# Patient Record
Sex: FEMALE | Race: WHITE | NOT HISPANIC OR LATINO | Employment: FULL TIME | URBAN - METROPOLITAN AREA
[De-identification: names, ages, dates, MRNs, and addresses within clinical notes are randomized per-mention and may not be internally consistent; named-entity substitution may affect disease eponyms.]

---

## 2017-09-12 ENCOUNTER — APPOINTMENT (OUTPATIENT)
Dept: WOMENS IMAGING | Facility: HOSPITAL | Age: 37
End: 2017-09-12

## 2017-09-12 PROCEDURE — 77067 SCR MAMMO BI INCL CAD: CPT | Performed by: RADIOLOGY

## 2017-09-12 PROCEDURE — 77063 BREAST TOMOSYNTHESIS BI: CPT | Performed by: RADIOLOGY

## 2017-09-27 ENCOUNTER — APPOINTMENT (OUTPATIENT)
Dept: WOMENS IMAGING | Facility: HOSPITAL | Age: 37
End: 2017-09-27

## 2017-09-27 PROCEDURE — 76641 ULTRASOUND BREAST COMPLETE: CPT | Performed by: RADIOLOGY

## 2017-09-27 PROCEDURE — G0206 DX MAMMO INCL CAD UNI: HCPCS | Performed by: RADIOLOGY

## 2017-09-27 PROCEDURE — G0279 TOMOSYNTHESIS, MAMMO: HCPCS | Performed by: RADIOLOGY

## 2017-09-27 PROCEDURE — 77065 DX MAMMO INCL CAD UNI: CPT | Performed by: RADIOLOGY

## 2017-09-27 PROCEDURE — 77061 BREAST TOMOSYNTHESIS UNI: CPT | Performed by: RADIOLOGY

## 2018-03-27 ENCOUNTER — APPOINTMENT (OUTPATIENT)
Dept: WOMENS IMAGING | Facility: HOSPITAL | Age: 38
End: 2018-03-27

## 2018-03-27 PROCEDURE — 76641 ULTRASOUND BREAST COMPLETE: CPT | Performed by: RADIOLOGY

## 2018-03-27 PROCEDURE — MDREVIEWSP: Performed by: RADIOLOGY

## 2018-10-09 ENCOUNTER — APPOINTMENT (OUTPATIENT)
Dept: WOMENS IMAGING | Facility: HOSPITAL | Age: 38
End: 2018-10-09

## 2018-10-09 PROCEDURE — 77067 SCR MAMMO BI INCL CAD: CPT | Performed by: RADIOLOGY

## 2018-10-09 PROCEDURE — 76641 ULTRASOUND BREAST COMPLETE: CPT | Performed by: RADIOLOGY

## 2018-10-09 PROCEDURE — 77063 BREAST TOMOSYNTHESIS BI: CPT | Performed by: RADIOLOGY

## 2018-10-29 ENCOUNTER — OFFICE VISIT (OUTPATIENT)
Dept: INTERNAL MEDICINE | Facility: CLINIC | Age: 38
End: 2018-10-29

## 2018-10-29 VITALS
HEIGHT: 63 IN | TEMPERATURE: 98.4 F | HEART RATE: 62 BPM | DIASTOLIC BLOOD PRESSURE: 86 MMHG | BODY MASS INDEX: 35.26 KG/M2 | SYSTOLIC BLOOD PRESSURE: 114 MMHG | OXYGEN SATURATION: 99 % | RESPIRATION RATE: 16 BRPM | WEIGHT: 199 LBS

## 2018-10-29 DIAGNOSIS — Z80.3 FAMILY HISTORY OF BREAST CANCER: ICD-10-CM

## 2018-10-29 DIAGNOSIS — Z00.00 ENCOUNTER FOR PREVENTIVE CARE: ICD-10-CM

## 2018-10-29 PROCEDURE — 99385 PREV VISIT NEW AGE 18-39: CPT | Performed by: NURSE PRACTITIONER

## 2018-10-29 NOTE — PROGRESS NOTES
Subjective      CC: establish care, family history of breast cancer    Cindy Ivan is a 38 y.o. female. She has no chronic health problems. She is not a smoker. She is single and has no children. She has no chronic health problems. Surgeries are only a fracture surgery to arm. Her sister had breast cancer at age 40 and mother had uterine cancer.     History of Present Illness     The following portions of the patient's history were reviewed and updated as appropriate: allergies, current medications, past family history, past medical history, past social history, past surgical history and problem list.    Review of Systems   Constitutional: Negative.  Negative for fever and unexpected weight change.   HENT: Negative.    Eyes: Negative.  Negative for photophobia, discharge and visual disturbance.   Respiratory: Negative.  Negative for apnea, cough, chest tightness and wheezing.    Cardiovascular: Negative.  Negative for chest pain, palpitations and leg swelling.   Gastrointestinal: Negative.    Endocrine: Negative.    Genitourinary: Negative.  Negative for decreased urine volume, difficulty urinating, dyspareunia and flank pain.   Musculoskeletal: Negative.  Negative for arthralgias and back pain.   Skin: Negative.  Negative for color change and pallor.   Allergic/Immunologic: Negative.  Negative for environmental allergies, food allergies and immunocompromised state.   Neurological: Negative.    Hematological: Negative.    Psychiatric/Behavioral: Negative.        Objective   Physical Exam   Constitutional: She is oriented to person, place, and time. She appears well-developed and well-nourished.   HENT:   Head: Normocephalic.   Right Ear: External ear normal.   Left Ear: External ear normal.   Nose: Nose normal.   Mouth/Throat: Oropharynx is clear and moist. No oropharyngeal exudate.   Neck: Neck supple. No thyromegaly present.   Cardiovascular: Normal rate, regular rhythm and normal heart sounds.    No murmur  heard.  Pulmonary/Chest: Effort normal and breath sounds normal. No respiratory distress. She has no wheezes.   Abdominal: Soft. Bowel sounds are normal. She exhibits no distension. There is no tenderness.   Musculoskeletal: She exhibits no edema.   Neurological: She is alert and oriented to person, place, and time.   Skin: Skin is warm and dry.   Psychiatric: She has a normal mood and affect. Her behavior is normal.   Vitals reviewed.      Assessment/Plan   Cindy was seen today for establish care.    Diagnoses and all orders for this visit:    Family history of breast cancer    Encounter for preventive care  -     CBC & Differential; Future  -     Comprehensive metabolic panel; Future  -     Conv Lipid Panel w/ Chol/HDL Ratio; Future  -     T4 & TSH ([facility]); Future    Discussed BRAC screening due to FH of breast cancer.    Reviewed preventive labs and cancer screenings appropriate for age. She has had a Mammogram with [facility].     Follow up for labs and once yearly.

## 2018-10-29 NOTE — PATIENT INSTRUCTIONS
Genomic Testing for Breast Cancer  What is genomic testing for breast cancer?  Genomic testing is performed on samples of breast cancer cells to help predict the outcome of the disease and to help direct treatment. Genomic testing is used to look at breast cancer cells on a molecular level, examining the gene groups in the cells and determining their activity.  Why is genomic testing for breast cancer done?  Genomic testing helps your health care provider choose the best type of treatment for your breast cancer. It provides information on how quickly the breast cancer cells are growing and how aggressive they might be in invading nearby tissue.  How is genomic testing for breast cancer done?  A sample of the tumor is sent to a lab that specializes in genomic testing. A variety of tests are run on the tissue. These tests show the activity of a group of genes in the breast cancer tissue. Based on these results, a score is generated that describes the risk of the cancer coming back again. The score may be numerical, such as 0-100, with 100 designating a high risk of the cancer coming back. Scoring may also involve sorting into risk categories, such as low, moderate, or high.  What will the test results tell me?  Genomic testing for breast cancer can provide a number of pieces of information. It can help predict:  · The best treatment for your breast cancer.  · The likelihood that you will benefit from receiving chemotherapy.  · The chance that your breast cancer will come back.    What is the difference between genomic testing and genetic testing for breast cancer?  Genetic testing looks at a person's genes and helps predict whether the person is at risk for developing cancer in the future. Genomic testing looks at cancer cells and identifies whether specific genes in the tumor are turned on or turned off. This can help the health care provider identify the best type of therapy to use against that tumor.  What is the  difference between genomic testing and pathology testing of a breast tumor?  Pathology testing provides information about the tumor. The pathology report will tell you:  · Whether the tumor cells are cancerous, meaning they are growing in a disorganized and uncontrolled fashion.  · Tumor size.  · How invasive the tumor cells are, meaning they have the capability of invading nearby tissue.  · Whether the cancer cells have stayed in their original location or have grown into other tissues in your body.  · Whether the tumor cells are fast growing or slow growing.  · If the entire tumor has been removed or whether there might be some cancer cells remaining behind.  · Whether there are cancer cells in your lymph nodes or blood vessels.  · Whether the cancer cells grow in response to hormones.  · Whether certain genes are present in the cancer cells and how many of these genes are present.    Genomic testing will tell you about the activity of groups of genes in your breast cancer cells. This activity can help your health care provider decide how aggressive the cancer is, how much risk you have of the cancer coming back, and whether you will benefit from treatments such as chemotherapy.  This information is not intended to replace advice given to you by your health care provider. Make sure you discuss any questions you have with your health care provider.  Document Released: 07/15/2015 Document Revised: 11/13/2017 Document Reviewed: 05/21/2015  [employer] Interactive Patient Education © 2017 [employer]

## 2018-11-01 PROBLEM — Z00.00 ENCOUNTER FOR PREVENTIVE CARE: Status: ACTIVE | Noted: 2018-11-01

## 2018-11-16 ENCOUNTER — LAB (OUTPATIENT)
Dept: INTERNAL MEDICINE | Facility: CLINIC | Age: 38
End: 2018-11-16

## 2018-11-16 DIAGNOSIS — Z00.00 ENCOUNTER FOR PREVENTIVE CARE: ICD-10-CM

## 2018-11-16 LAB
ALBUMIN SERPL-MCNC: 3.3 G/DL (ref 3.5–5.2)
ALBUMIN/GLOB SERPL: 1.4 G/DL
ALP SERPL-CCNC: 45 U/L (ref 40–129)
ALT SERPL-CCNC: 18 U/L (ref 5–33)
AST SERPL-CCNC: 18 U/L (ref 5–32)
BASOPHILS # BLD AUTO: 0.01 10*3/MM3 (ref 0–0.2)
BASOPHILS NFR BLD AUTO: 0.2 % (ref 0–2)
BILIRUB SERPL-MCNC: 0.5 MG/DL (ref 0.2–1.2)
BUN SERPL-MCNC: 10 MG/DL (ref 6–20)
BUN/CREAT SERPL: 14.7 (ref 7–25)
CALCIUM SERPL-MCNC: 8 MG/DL (ref 8.6–10.5)
CHLORIDE SERPL-SCNC: 102 MMOL/L (ref 98–107)
CHOLEST SERPL-MCNC: 152 MG/DL (ref 0–200)
CHOLEST/HDLC SERPL: 2.67 {RATIO}
CO2 SERPL-SCNC: 21.7 MMOL/L (ref 22–29)
CREAT SERPL-MCNC: 0.68 MG/DL (ref 0.57–1)
EOSINOPHIL # BLD AUTO: 0.07 10*3/MM3 (ref 0.1–0.3)
EOSINOPHIL NFR BLD AUTO: 1.7 % (ref 0–4)
ERYTHROCYTE [DISTWIDTH] IN BLOOD BY AUTOMATED COUNT: 13 % (ref 11.5–14.5)
GLOBULIN SER CALC-MCNC: 2.4 GM/DL
GLUCOSE SERPL-MCNC: 67 MG/DL (ref 65–99)
HCT VFR BLD AUTO: 42.8 % (ref 37–47)
HDLC SERPL-MCNC: 57 MG/DL (ref 40–60)
HGB BLD-MCNC: 13.8 G/DL (ref 12–16)
IMM GRANULOCYTES # BLD: 0 10*3/MM3 (ref 0–0.03)
IMM GRANULOCYTES NFR BLD: 0 % (ref 0–0.5)
LDLC SERPL CALC-MCNC: 88 MG/DL (ref 0–100)
LYMPHOCYTES # BLD AUTO: 1.62 10*3/MM3 (ref 0.6–4.8)
LYMPHOCYTES NFR BLD AUTO: 38.2 % (ref 20–45)
MCH RBC QN AUTO: 29.1 PG (ref 27–31)
MCHC RBC AUTO-ENTMCNC: 32.2 G/DL (ref 31–37)
MCV RBC AUTO: 90.3 FL (ref 81–99)
MONOCYTES # BLD AUTO: 0.37 10*3/MM3 (ref 0–1)
MONOCYTES NFR BLD AUTO: 8.7 % (ref 3–8)
NEUTROPHILS # BLD AUTO: 2.17 10*3/MM3 (ref 1.5–8.3)
NEUTROPHILS NFR BLD AUTO: 51.2 % (ref 45–70)
NRBC BLD AUTO-RTO: 0 /100 WBC (ref 0–0)
PLATELET # BLD AUTO: 284 10*3/MM3 (ref 140–500)
POTASSIUM SERPL-SCNC: 4.6 MMOL/L (ref 3.5–5.2)
PROT SERPL-MCNC: 5.7 G/DL (ref 6–8.5)
RBC # BLD AUTO: 4.74 10*6/MM3 (ref 4.2–5.4)
SODIUM SERPL-SCNC: 140 MMOL/L (ref 136–145)
T4 SERPL-MCNC: 7.73 MCG/DL (ref 4.5–11.7)
TRIGL SERPL-MCNC: 34 MG/DL (ref 0–150)
TSH SERPL DL<=0.005 MIU/L-ACNC: 1.06 MIU/ML (ref 0.27–4.2)
VLDLC SERPL CALC-MCNC: 6.8 MG/DL (ref 7–27)
WBC # BLD AUTO: 4.24 10*3/MM3 (ref 4.8–10.8)

## 2019-01-24 ENCOUNTER — OFFICE VISIT (OUTPATIENT)
Dept: INTERNAL MEDICINE | Facility: CLINIC | Age: 39
End: 2019-01-24

## 2019-01-24 ENCOUNTER — HOSPITAL ENCOUNTER (OUTPATIENT)
Dept: GENERAL RADIOLOGY | Facility: HOSPITAL | Age: 39
Discharge: HOME | End: 2019-01-24
Admitting: NURSE PRACTITIONER

## 2019-01-24 VITALS
HEIGHT: 63 IN | SYSTOLIC BLOOD PRESSURE: 130 MMHG | TEMPERATURE: 98.2 F | DIASTOLIC BLOOD PRESSURE: 78 MMHG | RESPIRATION RATE: 16 BRPM | HEART RATE: 76 BPM | OXYGEN SATURATION: 98 % | BODY MASS INDEX: 35.08 KG/M2 | WEIGHT: 198 LBS

## 2019-01-24 DIAGNOSIS — S20.212A RIB CONTUSION, LEFT, INITIAL ENCOUNTER: Primary | ICD-10-CM

## 2019-01-24 DIAGNOSIS — S20.212A RIB CONTUSION, LEFT, INITIAL ENCOUNTER: ICD-10-CM

## 2019-01-24 PROCEDURE — 71101 X-RAY EXAM UNILAT RIBS/CHEST: CPT

## 2019-01-24 PROCEDURE — 99213 OFFICE O/P EST LOW 20 MIN: CPT | Performed by: NURSE PRACTITIONER

## 2019-01-24 RX ORDER — MELOXICAM 15 MG/1
15 TABLET ORAL DAILY
Qty: 14 TABLET | Refills: 0 | Status: SHIPPED | OUTPATIENT
Start: 2019-01-24 | End: 2019-05-10

## 2019-01-24 NOTE — PROGRESS NOTES
Chief Complaint   Patient presents with   • Abdominal Pain     LUQ-onset after execise x 2 weeks       Subjective     Cindy Ivan is a 38 y.o. female being seen for a follow up appointment today regarding Left side pain that started after doing planked back extensions, 2 weeks ago at the gym.. She felt a pop in her left side, when she lifted her back up. Described as a dull, ache. Rated 4 of 10. Worse with laying on left side, sitting,  and twisting torso. Associted swelling, but no bruising. She alternated heat/ice and took Ibuprofen. It has progressively improved.      History of Present Illness     No Known Allergies    No current outpatient medications on file.    The following portions of the patient's history were reviewed and updated as appropriate: allergies, current medications, past family history, past medical history, past social history, past surgical history and problem list.    Review of Systems   Constitutional: Negative.    HENT: Negative.    Eyes: Negative.    Respiratory: Positive for shortness of breath.         On inhalation x 4 days but resolved now   Cardiovascular: Negative.    Gastrointestinal: Positive for abdominal pain.        LUQ   Endocrine: Negative.    Genitourinary: Negative.    Musculoskeletal: Negative.    Skin: Negative.    Allergic/Immunologic: Negative.    Neurological: Negative.    Hematological: Negative.    Psychiatric/Behavioral: Negative.        Assessment     Physical Exam   Constitutional: She is oriented to person, place, and time. She appears well-developed and well-nourished.   HENT:   Head: Normocephalic.   Right Ear: External ear normal.   Left Ear: External ear normal.   Nose: Nose normal.   Mouth/Throat: Oropharynx is clear and moist. No oropharyngeal exudate.   Neck: Neck supple. No thyromegaly present.   Cardiovascular: Normal rate, regular rhythm and normal heart sounds.   No murmur heard.  Pulmonary/Chest: Breath sounds normal. No accessory muscle usage or  stridor. No respiratory distress. She has no wheezes. She exhibits tenderness (along left sternal border, 6th and 7th ribs) and swelling (left 6th anfd 7th ribs). She exhibits no mass and no laceration.   Abdominal: Soft. Bowel sounds are normal. She exhibits no distension.   Neurological: She is alert and oriented to person, place, and time.   Skin: Skin is warm and dry.   Psychiatric: She has a normal mood and affect. Her behavior is normal.   Vitals reviewed.      Plan     Her fasting labs were reviewed with the patient from last week.     Cindy was seen today for abdominal pain.    Diagnoses and all orders for this visit:    Rib contusion, left, initial encounter  -     XR Ribs Left With PA Chest; Future    Other orders  -     meloxicam (MOBIC) 15 MG tablet; Take 1 tablet by mouth Daily.    Discussed nature of the injury, and healing time may take 6-8 weeks. Use heat and take Mobic as instructed.     Follow up as needed

## 2019-01-24 NOTE — PATIENT INSTRUCTIONS
Rib Contusion  A rib contusion is a deep bruise on your rib area. Contusions are the result of a blunt trauma that causes bleeding and injury to the tissues under the skin. A rib contusion may involve bruising of the ribs and of the skin and muscles in the area. The skin overlying the contusion may turn blue, purple, or yellow. Minor injuries will give you a painless contusion, but more severe contusions may stay painful and swollen for a few weeks.  What are the causes?  A contusion is usually caused by a blow, trauma, or direct force to an area of the body. This often occurs while playing contact sports.  What are the signs or symptoms?  · Swelling and redness of the injured area.  · Discoloration of the injured area.  · Tenderness and soreness of the injured area.  · Pain with or without movement.  How is this diagnosed?  The diagnosis can be made by taking a medical history and performing a physical exam. An X-ray, CT scan, or MRI may be needed to determine if there were any associated injuries, such as broken bones (fractures) or internal injuries.  How is this treated?  Often, the best treatment for a rib contusion is rest. Icing or applying cold compresses to the injured area may help reduce swelling and inflammation. Deep breathing exercises may be recommended to reduce the risk of partial lung collapse and pneumonia. Over-the-counter or prescription medicines may also be recommended for pain control.  Follow these instructions at home:  · Apply ice to the injured area:  ? Put ice in a plastic bag.  ? Place a towel between your skin and the bag.  ? Leave the ice on for 20 minutes, 2-3 times per day.  · Take medicines only as directed by your health care provider.  · Rest the injured area. Avoid strenuous activity and any activities or movements that cause pain. Be careful during activities and avoid bumping the injured area.  · Perform deep-breathing exercises as directed by your health care provider.  · Do  not lift anything that is heavier than 5 lb (2.3 kg) until your health care provider approves.  · Do not use any tobacco products, including cigarettes, chewing tobacco, or electronic cigarettes. If you need help quitting, ask your health care provider.  Contact a health care provider if:  · You have increased bruising or swelling.  · You have pain that is not controlled with treatment.  · You have a fever.  Get help right away if:  · You have difficulty breathing or shortness of breath.  · You develop a continual cough, or you cough up thick or bloody sputum.  · You feel sick to your stomach (nauseous), you throw up (vomit), or you have abdominal pain.  This information is not intended to replace advice given to you by your health care provider. Make sure you discuss any questions you have with your health care provider.  Document Released: 09/12/2002 Document Revised: 05/25/2017 Document Reviewed: 09/29/2015  [employer] Interactive Patient Education © 2018 [employer]

## 2019-01-25 ENCOUNTER — TELEPHONE (OUTPATIENT)
Dept: INTERNAL MEDICINE | Facility: CLINIC | Age: 39
End: 2019-01-25

## 2019-05-10 ENCOUNTER — OFFICE VISIT (OUTPATIENT)
Dept: INTERNAL MEDICINE | Facility: CLINIC | Age: 39
End: 2019-05-10

## 2019-05-10 VITALS
BODY MASS INDEX: 34.91 KG/M2 | TEMPERATURE: 98.9 F | RESPIRATION RATE: 16 BRPM | HEIGHT: 63 IN | HEART RATE: 84 BPM | SYSTOLIC BLOOD PRESSURE: 112 MMHG | OXYGEN SATURATION: 98 % | DIASTOLIC BLOOD PRESSURE: 82 MMHG | WEIGHT: 197 LBS

## 2019-05-10 DIAGNOSIS — J01.00 ACUTE MAXILLARY SINUSITIS, RECURRENCE NOT SPECIFIED: Primary | ICD-10-CM

## 2019-05-10 DIAGNOSIS — H66.91 RIGHT ACUTE OTITIS MEDIA: ICD-10-CM

## 2019-05-10 PROCEDURE — 99213 OFFICE O/P EST LOW 20 MIN: CPT | Performed by: NURSE PRACTITIONER

## 2019-05-10 RX ORDER — AZITHROMYCIN 250 MG/1
TABLET, FILM COATED ORAL
Qty: 6 TABLET | Refills: 0 | Status: SHIPPED | OUTPATIENT
Start: 2019-05-10 | End: 2019-11-07

## 2019-05-10 NOTE — PROGRESS NOTES
Chief Complaint   Patient presents with   • Nasal Congestion   • URI       Subjective   Cindy Ivan is a 38 y.o. female is being seen for an acute appointment for URI. This began Monday with body aches, fever. She then developed ear pain, cough, nasal congestion. She is taking OTC cough syrup.     History of Present Illness     Current Outpatient Medications on File Prior to Visit   Medication Sig Dispense Refill   • [DISCONTINUED] meloxicam (MOBIC) 15 MG tablet Take 1 tablet by mouth Daily. 14 tablet 0     No current facility-administered medications on file prior to visit.        The following portions of the patient's history were reviewed and updated as appropriate: allergies, current medications, past family history, past medical history, past social history, past surgical history and problem list.    Review of Systems   Constitutional: Positive for fever.   HENT: Positive for congestion, postnasal drip, rhinorrhea, sinus pressure, sinus pain, sneezing and sore throat.    Eyes: Negative.    Respiratory: Positive for cough and shortness of breath.    Cardiovascular: Negative.  Negative for chest pain and leg swelling.   Endocrine: Negative.    Musculoskeletal: Negative.    Allergic/Immunologic: Positive for environmental allergies.   Neurological: Negative.    Psychiatric/Behavioral: Negative.        Objective   Physical Exam   Constitutional: She is oriented to person, place, and time. She appears well-developed and well-nourished.   HENT:   Head: Normocephalic.   Right Ear: Tympanic membrane is injected. A middle ear effusion is present.   Left Ear: A middle ear effusion is present.   Nose: Mucosal edema and rhinorrhea present. Right sinus exhibits frontal sinus tenderness. Left sinus exhibits frontal sinus tenderness.   Mouth/Throat: Oropharynx is clear and moist.   Neck: Neck supple.   Cardiovascular: Normal rate, regular rhythm and normal heart sounds.   No murmur heard.  Pulmonary/Chest: Effort normal and  breath sounds normal. No stridor. No respiratory distress.   Neurological: She is alert and oriented to person, place, and time.   Skin: Skin is warm and dry.   Psychiatric: She has a normal mood and affect. Her behavior is normal.   Vitals reviewed.      Assessment/Plan   Cindy was seen today for nasal congestion and uri.    Diagnoses and all orders for this visit:    Acute maxillary sinusitis, recurrence not specified  -     azithromycin (ZITHROMAX Z-MEGHANN) 250 MG tablet; Take 2 tablets the first day, then 1 tablet daily for 4 days.    Right acute otitis media  -     azithromycin (ZITHROMAX Z-MEGHANN) 250 MG tablet; Take 2 tablets the first day, then 1 tablet daily for 4 days.      Follow up as needed

## 2019-11-07 ENCOUNTER — OFFICE VISIT (OUTPATIENT)
Dept: INTERNAL MEDICINE | Facility: CLINIC | Age: 39
End: 2019-11-07

## 2019-11-07 VITALS
HEART RATE: 65 BPM | TEMPERATURE: 98 F | DIASTOLIC BLOOD PRESSURE: 86 MMHG | BODY MASS INDEX: 35.97 KG/M2 | SYSTOLIC BLOOD PRESSURE: 118 MMHG | WEIGHT: 203 LBS | RESPIRATION RATE: 16 BRPM | OXYGEN SATURATION: 99 % | HEIGHT: 63 IN

## 2019-11-07 DIAGNOSIS — M70.62 TROCHANTERIC BURSITIS OF LEFT HIP: Primary | ICD-10-CM

## 2019-11-07 PROBLEM — J01.00 ACUTE MAXILLARY SINUSITIS: Status: RESOLVED | Noted: 2019-05-10 | Resolved: 2019-11-07

## 2019-11-07 PROCEDURE — 99213 OFFICE O/P EST LOW 20 MIN: CPT | Performed by: NURSE PRACTITIONER

## 2019-11-07 RX ORDER — METHYLPREDNISOLONE 4 MG/1
TABLET ORAL
Qty: 21 EACH | Refills: 0 | Status: SHIPPED | OUTPATIENT
Start: 2019-11-07

## 2019-11-07 NOTE — PATIENT INSTRUCTIONS
Trochanteric Bursitis Rehab  Ask your health care provider which exercises are safe for you. Do exercises exactly as told by your health care provider and adjust them as directed. It is normal to feel mild stretching, pulling, tightness, or discomfort as you do these exercises, but you should stop right away if you feel sudden pain or your pain gets worse. Do not begin these exercises until told by your health care provider.  Stretching exercises  These exercises warm up your muscles and joints and improve the movement and flexibility of your hip. These exercises also help to relieve pain and stiffness.  Exercise A: Iliotibial band stretch    Lie on your side with your left / right leg in the top position.  Bend your left / right knee and grab your ankle.  Slowly bring your knee back so your thigh is behind your body.  Slowly lower your knee toward the floor until you feel a gentle stretch on the outside of your left / right thigh. If you do not feel a stretch and your knee will not fall farther, place the heel of your other foot on top of your outer knee and pull your thigh down farther.  Hold this position for __________ seconds.  Slowly return to the starting position.  Repeat __________ times. Complete this exercise __________ times a day.  Strengthening exercises  These exercises build strength and endurance in your hip and pelvis. Endurance is the ability to use your muscles for a long time, even after they get tired.  Exercise B: Bridge (hip extensors)    Lie on your back on a firm surface with your knees bent and your feet flat on the floor.  Tighten your buttocks muscles and lift your buttocks off the floor until your trunk is level with your thighs. You should feel the muscles working in your buttocks and the back of your thighs. If this exercise is too easy, try doing it with your arms crossed over your chest.  Hold this position for __________ seconds.  Slowly return to the starting position.  Let your  muscles relax completely between repetitions.  Repeat __________ times. Complete this exercise __________ times a day.  Exercise C: Squats (knee extensors and  quadriceps)   front of a table, with your feet and knees pointing straight ahead. You may rest your hands on the table for balance but not for support.  Slowly bend your knees and lower your hips like you are going to sit in a chair.  Keep your weight over your heels, not over your toes.  Keep your lower legs upright so they are parallel with the table legs.  Do not let your hips go lower than your knees.  Do not bend lower than told by your health care provider.  If your hip pain increases, do not bend as low.  Hold this position for __________ seconds.  Slowly push with your legs to return to standing. Do not use your hands to pull yourself to standing.  Repeat __________ times. Complete this exercise __________ times a day.  Exercise D: Hip hike  Stand sideways on a bottom step. Stand on your left / right leg with your other foot unsupported next to the step. You can hold onto the railing or wall if needed for balance.  Keeping your knees straight and your torso square, lift your left / right hip up toward the ceiling.  Hold this position for __________ seconds.  Slowly let your left / right hip lower toward the floor, past the starting position. Your foot should get closer to the floor. Do not lean or bend your knees.  Repeat __________ times. Complete this exercise __________ times a day.  Exercise E: Single leg stand  Stand near a counter or door frame that you can hold onto for balance as needed. It is helpful to  front of a mirror for this exercise so you can watch your hip.  Squeeze your left / right buttock muscles then lift up your other foot. Do not let your left / right hip push out to the side.  Hold this position for __________ seconds.  Repeat __________ times. Complete this exercise __________ times a day.  This information is  not intended to replace advice given to you by your health care provider. Make sure you discuss any questions you have with your health care provider.  Document Released: 01/25/2006 Document Revised: 08/24/2017 Document Reviewed: 12/02/2016  [employer] Interactive Patient Education © 2019 [employer]  Trochanteric Bursitis  Trochanteric bursitis is a condition that causes hip pain. Trochanteric bursitis happens when fluid-filled sacs (bursae) in the hip get irritated. Normally these sacs absorb shock and help strong bands of tissue (tendons) in your hip glide smoothly over each other and over your hip bones.  What are the causes?  This condition results from increased friction between the hip bones and the tendons that go over them. This condition can happen if you:  · Have weak hips.  · Use your hip muscles too much (overuse).  · Get hit in the hip.  What increases the risk?  This condition is more likely to develop in:  · Women.  · Adults who are middle-aged or older.  · People with arthritis or a spinal condition.  · People with weak buttocks muscles (gluteal muscles).  · People who have one leg that is shorter than the other.  · People who participate in certain kinds of athletic activities, such as:  ? Running sports, especially long-distance running.  ? Contact sports, like football or martial arts.  ? Sports in which falls may occur, like skiing.  What are the signs or symptoms?  The main symptom of this condition is pain and tenderness over the point of your hip. The pain may be:  · Sharp and intense.  · Dull and achy.  · Felt on the outside of your thigh.  It may increase when you:  · Lie on your side.  · Walk or run.  · Go up on stairs.  · Sit.  · Stand up after sitting.  · Stand for long periods of time.  How is this diagnosed?  This condition may be diagnosed based on:  · Your symptoms.  · Your medical history.  · A physical exam.  · Imaging tests, such as:  ? X-rays to check your bones.  ? An MRI or  ultrasound to check your tendons and muscles.  During your physical exam, your health care provider will check the movement and strength of your hip. He or she may press on the point of your hip to check for pain.  How is this treated?  This condition may be treated by:  · Resting.  · Reducing your activity.  · Avoiding activities that cause pain.  · Using crutches, a cane, or a walker to decrease the strain on your hip.  · Taking medicine to help with swelling.  · Having medicine injected into the bursae to help with swelling.  · Using ice, heat, and massage therapy for pain relief.  · Physical therapy exercises for strength and flexibility.  · Surgery (rare).  Follow these instructions at home:  Activity  · Rest.  · Avoid activities that cause pain.  · Return to your normal activities as told by your health care provider. Ask your health care provider what activities are safe for you.  Managing pain, stiffness, and swelling  · Take over-the-counter and prescription medicines only as told by your health care provider.  · If directed, apply heat to the injured area as told by your health care provider.  ? Place a towel between your skin and the heat source.  ? Leave the heat on for 20-30 minutes.  ? Remove the heat if your skin turns bright red. This is especially important if you are unable to feel pain, heat, or cold. You may have a greater risk of getting burned.  · If directed, apply ice to the injured area:  ? Put ice in a plastic bag.  ? Place a towel between your skin and the bag.  ? Leave the ice on for 20 minutes, 2-3 times a day.  General instructions  · If the affected leg is one that you use for driving, ask your health care provider when it is safe to drive.  · Use crutches, a cane, or a walker as told by your health care provider.  · If one of your legs is shorter than the other, get fitted for a shoe insert.  · Lose weight if you are overweight.  How is this prevented?  · Wear supportive footwear that  is appropriate for your sport.  · If you have hip pain, start any new exercise or sport slowly.  · Maintain physical fitness, including:  ? Strength.  ? Flexibility.  Contact a health care provider if:  · Your pain does not improve with 2-4 weeks.  Get help right away if:  · You develop severe pain.  · You have a fever.  · You develop increased redness over your hip.  · You have a change in your bowel function or bladder function.  · You cannot control the muscles in your feet.  This information is not intended to replace advice given to you by your health care provider. Make sure you discuss any questions you have with your health care provider.  Document Released: 01/25/2006 Document Revised: 08/23/2017 Document Reviewed: 12/02/2016  [employer] Interactive Patient Education © 2019 [employer]

## 2019-11-07 NOTE — PROGRESS NOTES
Chief Complaint   Patient presents with   • Hip Pain     LT hip pain X4 weeks with activity        Subjective   Cindy Ivan is a 39 y.o. female is being seen for an acute appointment for left hip pain. She has seen Dr. Johnson in the past for bursitis and has had a cortisone shot in the past. She started running for exercise a few months ago. She reports that left hip pain started 4 weeks ago. Pain worse with long strides, running , laying on her left side, after sitting for a while and getting back up.  Pain described as a sharp pain, rated 4 of 10. Pain 0 at rest.     History of Present Illness     Current Outpatient Medications on File Prior to Visit   Medication Sig Dispense Refill   • [DISCONTINUED] azithromycin (ZITHROMAX Z-MEGHANN) 250 MG tablet Take 2 tablets the first day, then 1 tablet daily for 4 days. 6 tablet 0     No current facility-administered medications on file prior to visit.        The following portions of the patient's history were reviewed and updated as appropriate: allergies, current medications, past family history, past medical history, past social history, past surgical history and problem list.    Review of Systems   Constitutional: Negative.    HENT: Negative.    Eyes: Negative.    Respiratory: Negative for shortness of breath, wheezing and stridor.    Cardiovascular: Negative.  Negative for chest pain, palpitations and leg swelling.   Endocrine: Negative.    Musculoskeletal: Positive for arthralgias.   Allergic/Immunologic: Negative.    Psychiatric/Behavioral: Negative for agitation and behavioral problems. The patient is not nervous/anxious.        Objective   Physical Exam   Constitutional: She appears well-developed and well-nourished. No distress.   Cardiovascular: Normal rate, regular rhythm and normal heart sounds.   No murmur heard.  Musculoskeletal:        Left hip: She exhibits tenderness (Full ROM on lweft hip. non tender to palpation, pain with compression of left hip  bursa. ). She exhibits normal range of motion, normal strength, no bony tenderness, no swelling, no crepitus, no deformity and no laceration.   Vitals reviewed.      Assessment/Plan   Cindy was seen today for hip pain.    Diagnoses and all orders for this visit:    Trochanteric bursitis of left hip  -     methylPREDNISolone (MEDROL, MEGHANN,) 4 MG tablet; Take as directed on package instructions.    No running for 2 weeks, then add a walk for exercise. Use proper shoe inserts. Call for ortho referral if no help with medrol dose pack.       Follow up as needed

## 2019-11-19 ENCOUNTER — APPOINTMENT (OUTPATIENT)
Dept: WOMENS IMAGING | Facility: HOSPITAL | Age: 39
End: 2019-11-19

## 2019-11-19 PROCEDURE — 77067 SCR MAMMO BI INCL CAD: CPT | Performed by: RADIOLOGY

## 2020-11-24 ENCOUNTER — APPOINTMENT (OUTPATIENT)
Dept: WOMENS IMAGING | Facility: HOSPITAL | Age: 40
End: 2020-11-24

## 2020-11-24 PROCEDURE — 77067 SCR MAMMO BI INCL CAD: CPT | Performed by: RADIOLOGY

## 2020-11-24 PROCEDURE — 77063 BREAST TOMOSYNTHESIS BI: CPT | Performed by: RADIOLOGY

## 2021-04-02 ENCOUNTER — BULK ORDERING (OUTPATIENT)
Dept: CASE MANAGEMENT | Facility: OTHER | Age: 41
End: 2021-04-02

## 2021-04-02 DIAGNOSIS — Z23 IMMUNIZATION DUE: ICD-10-CM

## 2021-12-10 ENCOUNTER — APPOINTMENT (OUTPATIENT)
Dept: WOMENS IMAGING | Facility: HOSPITAL | Age: 41
End: 2021-12-10

## 2021-12-10 PROCEDURE — 77067 SCR MAMMO BI INCL CAD: CPT | Performed by: RADIOLOGY

## 2021-12-10 PROCEDURE — 77063 BREAST TOMOSYNTHESIS BI: CPT | Performed by: RADIOLOGY

## 2022-12-13 ENCOUNTER — APPOINTMENT (OUTPATIENT)
Dept: WOMENS IMAGING | Facility: HOSPITAL | Age: 42
End: 2022-12-13

## 2022-12-13 PROCEDURE — 77063 BREAST TOMOSYNTHESIS BI: CPT | Performed by: RADIOLOGY

## 2022-12-13 PROCEDURE — 77067 SCR MAMMO BI INCL CAD: CPT | Performed by: RADIOLOGY

## 2024-07-31 ENCOUNTER — NURSE TRIAGE (OUTPATIENT)
Dept: CALL CENTER | Facility: HOSPITAL | Age: 44
End: 2024-07-31
Payer: COMMERCIAL

## 2024-07-31 ENCOUNTER — HOSPITAL ENCOUNTER (EMERGENCY)
Facility: HOSPITAL | Age: 44
Discharge: HOME | End: 2024-07-31
Attending: STUDENT IN AN ORGANIZED HEALTH CARE EDUCATION/TRAINING PROGRAM
Payer: COMMERCIAL

## 2024-07-31 VITALS
WEIGHT: 195 LBS | OXYGEN SATURATION: 99 % | BODY MASS INDEX: 34.55 KG/M2 | HEART RATE: 79 BPM | DIASTOLIC BLOOD PRESSURE: 96 MMHG | TEMPERATURE: 98.6 F | SYSTOLIC BLOOD PRESSURE: 155 MMHG | RESPIRATION RATE: 16 BRPM | HEIGHT: 63 IN

## 2024-07-31 DIAGNOSIS — M54.32 SCIATICA OF LEFT SIDE: Primary | ICD-10-CM

## 2024-07-31 PROCEDURE — 25010000002 KETOROLAC TROMETHAMINE PER 15 MG: Performed by: STUDENT IN AN ORGANIZED HEALTH CARE EDUCATION/TRAINING PROGRAM

## 2024-07-31 PROCEDURE — 96372 THER/PROPH/DIAG INJ SC/IM: CPT

## 2024-07-31 PROCEDURE — 99283 EMERGENCY DEPT VISIT LOW MDM: CPT

## 2024-07-31 RX ORDER — CYCLOBENZAPRINE HCL 10 MG
10 TABLET ORAL 3 TIMES DAILY PRN
Qty: 30 TABLET | Refills: 0 | Status: SHIPPED | OUTPATIENT
Start: 2024-07-31 | End: 2024-07-31

## 2024-07-31 RX ORDER — CYCLOBENZAPRINE HCL 10 MG
10 TABLET ORAL 3 TIMES DAILY PRN
Qty: 30 TABLET | Refills: 0 | Status: SHIPPED | OUTPATIENT
Start: 2024-07-31 | End: 2024-08-02

## 2024-07-31 RX ORDER — CYCLOBENZAPRINE HCL 10 MG
10 TABLET ORAL ONCE
Status: COMPLETED | OUTPATIENT
Start: 2024-07-31 | End: 2024-07-31

## 2024-07-31 RX ORDER — KETOROLAC TROMETHAMINE 30 MG/ML
30 INJECTION, SOLUTION INTRAMUSCULAR; INTRAVENOUS ONCE
Status: COMPLETED | OUTPATIENT
Start: 2024-07-31 | End: 2024-07-31

## 2024-07-31 RX ADMIN — CYCLOBENZAPRINE 10 MG: 10 TABLET, FILM COATED ORAL at 22:04

## 2024-07-31 RX ADMIN — KETOROLAC TROMETHAMINE 30 MG: 30 INJECTION, SOLUTION INTRAMUSCULAR; INTRAVENOUS at 22:04

## 2024-07-31 NOTE — Clinical Note
LA GRANJELANI  [facility] EMERGENCY DEPARTMENT  [address] [place] [address]  Phone: [phone]    Cindy Ivan was seen and treated in our emergency department on 7/31/2024.  She may return to work on 08/03/2024.         Thank you for choosing [facility].    Torres Vaz MD

## 2024-07-31 NOTE — TELEPHONE ENCOUNTER
"Reason for Disposition   Long-distance travel in past month (e.g., car, bus, train, plane; with trip lasting 6 or more hours)    Additional Information   Negative: Looks like a broken bone or dislocated joint (e.g., crooked or deformed)   Negative: Sounds like a life-threatening emergency to the triager   Negative: Followed a leg injury   Negative: Leg swelling is main symptom   Negative: Back pain radiating (shooting) into leg(s)   Negative: Knee pain is main symptom   Negative: Ankle pain is main symptom   Negative: Pregnant   Negative: Postpartum (from 0 to 6 weeks after delivery)   Negative: Chest pain   Negative: Difficulty breathing   Negative: Entire foot is cool or blue in comparison to other side   Negative: Unable to walk   Negative: [1] Red area or streak AND [2] fever   Negative: [1] Swollen joint AND [2] fever   Negative: [1] Cast on leg or ankle AND [2] now increased pain   Negative: Patient sounds very sick or weak to the triager   Negative: [1] SEVERE pain (e.g., excruciating, unable to do any normal activities) AND [2] not improved after 2 hours of pain medicine   Negative: [1] Thigh or calf pain AND [2] only 1 side AND [3] present > 1 hour (Exception: Chronic unchanged pain.)   Negative: [1] Thigh, calf, or ankle swelling AND [2] only 1 side   Negative: [1] Thigh, calf, or ankle swelling AND [2] bilateral AND [3] 1 side is more swollen   Negative: [1] Red area or streak AND [2] large (> 2 in. or 5 cm)   Negative: History of prior \"blood clot\" in leg or lungs (i.e., deep vein thrombosis, pulmonary embolism)   Negative: History of inherited increased risk of blood clots (e.g., Factor 5 Leiden, Anti-thrombin 3, Protein C or Protein S deficiency, Prothrombin mutation)   Negative: Major surgery in past month   Negative: Hip or leg fracture (broken bone) in past month (or had cast on leg or ankle in past month)   Negative: Illness requiring prolonged bedrest in past month (e.g., immobilization, long " "hospital stay)    Answer Assessment - Initial Assessment Questions  1. ONSET: \"When did the pain start?\"       7/27  2. LOCATION: \"Where is the pain located?\"       Left foot 3rd and 4th toes numb and having left buttock pain sharp nerve like pain  3. PAIN: \"How bad is the pain?\"    (Scale 1-10; or mild, moderate, severe)    -  MILD (1-3): doesn't interfere with normal activities     -  MODERATE (4-7): interferes with normal activities (e.g., work or school) or awakens from sleep, limping     -  SEVERE (8-10): excruciating pain, unable to do any normal activities, unable to walk      Right now 5 but it does get up to 9  4. WORK OR EXERCISE: \"Has there been any recent work or exercise that involved this part of the body?\"       No, she has had pain in buttock for over two weeks.  The only thing she did differently was wore high heels on 7/27 and her toes went numb and stayed numb.    5. CAUSE: \"What do you think is causing the leg pain?\"      As above  6. OTHER SYMPTOMS: \"Do you have any other symptoms?\" (e.g., chest pain, back pain, breathing difficulty, swelling, rash, fever, numbness, weakness)      Painful to walk  7. PREGNANCY: \"Is there any chance you are pregnant?\" \"When was your last menstrual period?\"      na    Protocols used: Leg Pain-ADULT-AH    "

## 2024-07-31 NOTE — Clinical Note
LA GRANJELANI  [facility] EMERGENCY DEPARTMENT  [address] [address]  Phone: [phone]    Cindy Ivan was seen and treated in our emergency department on 7/31/2024.  She may return to work on 08/03/2024.         Thank you for choosing [facility].    Torres Vaz MD

## 2024-08-01 NOTE — ED PROVIDER NOTES
Subjective   History of Present Illness  Pt is a 43 y.o. female with PMH as listed who presents for   Chief Complaint   Patient presents with    Back Pain       Patient is a 43-year-old female presents for left buttocks pain rating into left upper leg and over the past 5 days has had some numbness of 2 toes of her left foot.  States pain is worse with certain movements and walking, not worse with straight leg raise.  Has not seen anyone for the symptoms.  The buttocks and leg pain has been present for the past several weeks the numbness over the past several days.  Has a PCP appointment in the next several days but cannot wait and so presented to the ED for further evaluation and management.  Patient with any red flag signs or symptoms, no fever, no injury to the area, no midline T or L-spine tenderness, normal sensation diffusely across left lower extremity and no saddle anesthesia and no bowel or bladder incontinence.      Review of Systems    No past medical history on file.    No Known Allergies    Past Surgical History:   Procedure Laterality Date    FRACTURE SURGERY      Right arm with plate and screws       Family History   Problem Relation Age of Onset    Uterine cancer Mother     Thyroid disease Mother     Glaucoma Father     Cancer Sister     Breast cancer Sister     Heart disease Maternal Grandmother     Stroke Maternal Grandmother     Hypertension Paternal Grandmother        Social History     Socioeconomic History    Marital status: Single   Tobacco Use    Smoking status: Never    Smokeless tobacco: Never   Substance and Sexual Activity    Alcohol use: Yes     Alcohol/week: 1.0 standard drink of alcohol     Types: 1 Glasses of wine per week     Comment: Monthly     Drug use: Yes    Sexual activity: Yes     Partners: Male           Objective   Physical Exam  Constitutional:       Appearance: Normal appearance.   HENT:      Head: Normocephalic and atraumatic.      Mouth/Throat:      Mouth: Mucous  membranes are moist.      Pharynx: Oropharynx is clear.   Eyes:      Conjunctiva/sclera: Conjunctivae normal.   Cardiovascular:      Rate and Rhythm: Normal rate.   Pulmonary:      Effort: Pulmonary effort is normal.   Abdominal:      General: Abdomen is flat.   Musculoskeletal:      Cervical back: Neck supple.      Comments: No T or L-spine tenderness, normal neuroexam to the bilateral lower extremities, no saddle anesthesia.   Skin:     General: Skin is warm and dry.   Neurological:      Mental Status: She is alert.   Psychiatric:         Mood and Affect: Mood normal.         Procedures           ED Course  ED Course as of 07/31/24 2156 Wed Jul 31, 2024 2153 Patient is a 43-year-old female presents for left buttocks pain rating into left upper leg and over the past 5 days has had some numbness of 2 toes of her left foot.  States pain is worse with certain movements and walking, not worse with straight leg raise.  Has not seen anyone for the symptoms.  The buttocks and leg pain has been present for the past several weeks the numbness over the past several days.  Has a PCP appointment in the next several days but cannot wait and so presented to the ED for further evaluation and management.  Patient with any red flag signs or symptoms, no fever, no injury to the area, no midline T or L-spine tenderness, normal sensation diffusely across left lower extremity and no saddle anesthesia and no bowel or bladder incontinence.  Will treat with Toradol and Flexeril here and prescription for Flexeril sent to patient's pharmacy.  Patient understands and agrees with plan of care and given return precautions.  Patient understands and agrees, all questions answered. [JF]      ED Course User Index  [JF] Torres Vaz MD                                             Medical Decision Making  My diagnosis for lower extremity pain and injury includes but is not limited to hip fracture, femur fracture, hip dislocation, hip  contusion, hip sprain, hip strain, pelvic fracture, ischio-tibial band pain, ischio-tibial band bursitis, knee sprain, patella dislocation, knee dislocation, internal derangement of knee, fractures of the femur, tibia, fibula, ankle, foot and digits, ankle sprain, ankle dislocation, Lisfranc fracture, fracture dislocations of the digits, pulmonary embolism, claudication, peripheral vascular disease, gout, osteoarthritis, rheumatoid arthritis, bursitis, septic joint, poly-rheumatica, polyarthralgia and other inflammatory or infectious disease processes.        Problems Addressed:  Sciatica of left side: complicated acute illness or injury    Amount and/or Complexity of Data Reviewed  Radiology: ordered.     Details: Considered imaging but with no acute injury to the area and no red flag signs or symptoms no negation for imaging at this time.    Risk  Prescription drug management.        Final diagnoses:   Sciatica of left side       ED Disposition  ED Disposition       ED Disposition   Discharge    Condition   Stable    Comment   --               Jeannette Fisher, APRN  [address]  [facility] [address] [phone]    Schedule an appointment as soon as possible for a visit in 2 days  For re-evaluation         Medication List        New Prescriptions      cyclobenzaprine 10 MG tablet  Commonly known as: FLEXERIL  Take 1 tablet by mouth 3 (Three) Times a Day As Needed for Muscle Spasms for up to 10 days.               Where to Get Your Medications        These medications were sent to [facility]/pharmacy #[id number] - [place], [place] - [address] [address] AT INTERSECTION OF [address] [phone] [facility] [phone]   [address] [address]      Phone: [phone]   cyclobenzaprine 10 MG tablet            Torres Vaz MD  07/31/24 2155       Torres Vaz MD  07/31/24 215

## 2024-08-01 NOTE — ED TRIAGE NOTES
Several weeks on ongoing worsening pain in middle of L buttock toes on L foot began to have sensory changes

## 2024-08-02 ENCOUNTER — HOSPITAL ENCOUNTER (OUTPATIENT)
Dept: GENERAL RADIOLOGY | Facility: HOSPITAL | Age: 44
Discharge: HOME | End: 2024-08-02
Admitting: NURSE PRACTITIONER
Payer: COMMERCIAL

## 2024-08-02 ENCOUNTER — OFFICE VISIT (OUTPATIENT)
Dept: INTERNAL MEDICINE | Facility: CLINIC | Age: 44
End: 2024-08-02
Payer: COMMERCIAL

## 2024-08-02 VITALS
DIASTOLIC BLOOD PRESSURE: 90 MMHG | BODY MASS INDEX: 36.32 KG/M2 | WEIGHT: 205 LBS | TEMPERATURE: 98.2 F | HEART RATE: 73 BPM | SYSTOLIC BLOOD PRESSURE: 136 MMHG | OXYGEN SATURATION: 97 % | HEIGHT: 63 IN

## 2024-08-02 DIAGNOSIS — M54.42 ACUTE LEFT-SIDED LOW BACK PAIN WITH LEFT-SIDED SCIATICA: ICD-10-CM

## 2024-08-02 DIAGNOSIS — Z76.89 ENCOUNTER TO ESTABLISH CARE: Primary | ICD-10-CM

## 2024-08-02 DIAGNOSIS — M54.42 ACUTE LEFT-SIDED LOW BACK PAIN WITH LEFT-SIDED SCIATICA: Primary | ICD-10-CM

## 2024-08-02 PROBLEM — H66.91 RIGHT ACUTE OTITIS MEDIA: Status: RESOLVED | Noted: 2019-05-10 | Resolved: 2024-08-02

## 2024-08-02 PROBLEM — Z00.00 ENCOUNTER FOR PREVENTIVE CARE: Status: RESOLVED | Noted: 2018-11-01 | Resolved: 2024-08-02

## 2024-08-02 PROBLEM — S20.212A RIB CONTUSION, LEFT, INITIAL ENCOUNTER: Status: RESOLVED | Noted: 2019-01-24 | Resolved: 2024-08-02

## 2024-08-02 PROBLEM — M70.62 TROCHANTERIC BURSITIS OF LEFT HIP: Status: RESOLVED | Noted: 2019-11-07 | Resolved: 2024-08-02

## 2024-08-02 PROBLEM — Z80.3 FAMILY HISTORY OF BREAST CANCER: Status: RESOLVED | Noted: 2018-10-29 | Resolved: 2024-08-02

## 2024-08-02 PROCEDURE — 99203 OFFICE O/P NEW LOW 30 MIN: CPT | Performed by: NURSE PRACTITIONER

## 2024-08-02 PROCEDURE — 72110 X-RAY EXAM L-2 SPINE 4/>VWS: CPT

## 2024-08-02 RX ORDER — ERGOCALCIFEROL (VITAMIN D2) 10 MCG
400 TABLET ORAL DAILY
COMMUNITY

## 2024-08-02 RX ORDER — MULTIPLE VITAMINS W/ MINERALS TAB 9MG-400MCG
1 TAB ORAL DAILY
COMMUNITY

## 2024-08-02 RX ORDER — PREDNISONE 10 MG/1
TABLET ORAL
Qty: 42 TABLET | Refills: 0 | Status: SHIPPED | OUTPATIENT
Start: 2024-08-02 | End: 2024-08-14

## 2024-08-02 RX ORDER — METHOCARBAMOL 500 MG/1
500 TABLET, FILM COATED ORAL 4 TIMES DAILY PRN
Qty: 60 TABLET | Refills: 0 | Status: SHIPPED | OUTPATIENT
Start: 2024-08-02 | End: 2024-08-22

## 2024-08-02 NOTE — PROGRESS NOTES
Subjective    Cindy Ivan is a 43 y.o. female presenting today for   Chief Complaint   Patient presents with    Establish Care     Went to the ED and was diagnosed with a pinched nerve and sciatica on the left side          Cindy Ivan presents today as a new patient to me to establish care.   Prior PCP was Jeannette Fisher. Her last visit was in 2019  Patient Care Team:  Naye Dunn APRN as PCP - General (Family Medicine)  Velma Pardo MD as Consulting Physician (Obstetrics and Gynecology)  Gerardo Serrano MD as Consulting Physician (Dermatology)  [facility]    Current/chronic health conditions include:    Patient Active Problem List   Diagnosis   (none) - all problems resolved or deleted       Outpatient Medications Marked as Taking for the 8/2/24 encounter (Office Visit) with Naye Dunn APRN   Medication Sig Dispense Refill    cyclobenzaprine (FLEXERIL) 10 MG tablet Take 1 tablet by mouth 3 (Three) Times a Day As Needed for Muscle Spasms for up to 10 days. 30 tablet 0    Magnesium Gluconate (MAGNESIUM 27 PO) Take  by mouth.      multivitamin with minerals tablet tablet Take 1 tablet by mouth Daily.      Probiotic Product (PROBIOTIC BLEND PO) Take  by mouth.      Vitamin D, Cholecalciferol, (CHOLECALCIFEROL) 10 MCG (400 UNIT) tablet Take 1 tablet by mouth Daily.         Pt presents c/o LBP radiating to the L LE.  She was seen in ED on 07/31/2024 for same. Note reviewed.  Intermittent  10/10 w/ movement  3-4/10 stationary  Sharp  No inciting injury.  Sudden onset around 1100 while going about normal work duties.  Has moved since ED visit to L-side lower back.  No relief w/ Toradol or Flexeril. Has also tried Ibuprofen and Tylenol at home. Very slight relief w/ these.        The following portions of the patient's history were reviewed and updated as appropriate: allergies, current medications, problem list, past medical history, past surgical history, family history, and social  "history.     Review of Systems   Gastrointestinal:         No bowel incontinence   Genitourinary:         No saddle anesthesia.  No bladder incontinence.   Neurological:  Negative for weakness.       Objective    Vitals:    08/02/24 1054   BP: 136/90   BP Location: Right arm   Patient Position: Lying   Cuff Size: Adult   Pulse: 73   Temp: 98.2 °F (36.8 °C)   TempSrc: Infrared   SpO2: 97%   Weight: 93 kg (205 lb)   Height: 160 cm (63\")     Body mass index is 36.31 kg/m².  Nursing notes and vitals reviewed.    Physical Exam  Constitutional:       General: She is not in acute distress.     Appearance: She is well-developed. She is not ill-appearing.   Pulmonary:      Effort: Pulmonary effort is normal.   Musculoskeletal:      Lumbar back: Tenderness present. No swelling or deformity. Decreased range of motion. Negative right straight leg raise test and negative left straight leg raise test.        Back:       Left hip: Normal.      Left upper leg: Normal.      Left knee: Normal.      Left lower leg: Normal.      Comments: TTP   Neurological:      Mental Status: She is alert.   Psychiatric:         Attention and Perception: She is attentive.         Speech: Speech normal.         No results found for this or any previous visit (from the past 672 hour(s)).      Assessment and Plan    Diagnoses and all orders for this visit:    1. Encounter to establish care (Primary)    2. Acute left-sided low back pain with left-sided sciatica  -     predniSONE (DELTASONE) 10 MG tablet; 6 tabs PO QD x 2days, then 5 tabs QD x2days, then 4 tabs QD x2days, then 3 tabs QD x2days, then 2 tabs QD x2 days, then 1 tab QD x2days  Dispense: 42 tablet; Refill: 0  -     methocarbamol (ROBAXIN) 500 MG tablet; Take 1 tablet by mouth 4 (Four) Times a Day As Needed for Muscle Spasms for up to 20 days.  Dispense: 60 tablet; Refill: 0  -     XR Spine Lumbar 4+ View; Future              Medications, including side effects, were discussed with the patient. " Patient verbalized understanding.  The plan of care was discussed. All questions were answered. Patient verbalized understanding.        Return if symptoms worsen or fail to improve.

## 2024-08-07 ENCOUNTER — TREATMENT (OUTPATIENT)
Dept: PHYSICAL THERAPY | Facility: CLINIC | Age: 44
End: 2024-08-07
Payer: COMMERCIAL

## 2024-08-07 DIAGNOSIS — M79.18 GLUTEAL PAIN: ICD-10-CM

## 2024-08-07 DIAGNOSIS — M54.32 SCIATICA OF LEFT SIDE: Primary | ICD-10-CM

## 2024-08-07 DIAGNOSIS — Z74.09 IMPAIRED MOBILITY AND ENDURANCE: ICD-10-CM

## 2024-08-07 NOTE — PROGRESS NOTES
Physical Therapy Initial Evaluation and Plan of Care  [facility]  [address], [address]  [place] [address]    Patient: Cindy Ivan   : 1980  Diagnosis/ICD-10 Code:  Sciatica of left side [M54.32]  Referring practitioner: DAJUAN Patterson  Today's Date: 2024    Subjective Evaluation    History of Present Illness  Date of onset: 2024  Mechanism of injury: Unknown mechanism. Pt reports she had a normal workout at the gym a few days prior to inciedent that included squat, deadlift and lunges, but nothing felt out of the ordinary until a couple days later.    Subjective comment: Last week had severe pain in my glutes and some N/T tin my toes and went to the ED  Patient Occupation: District manger, [employer]  Current pain ratin  At best pain ratin  At worst pain rating: 10  Location: L gluteal  Quality: sharp, cramping, grinding, pressure and squeezing  Relieving factors: medications (steroids, muscle relaxer)  Aggravating factors: stairs  Progression: worsening    Social Support  Lives with: alone    Hand dominance: right    Diagnostic Tests  X-ray: normal    Treatments  Previous treatment: medication and physical therapy  Current treatment: physical therapy  Patient Goals  Patient goals for therapy: decreased pain and return to sport/leisure activities  Patient goal: \           Objective          Neurological Testing     Sensation     Lumbar   Left   Intact: light touch    Right   Intact: light touch    Reflexes   Left   Patellar (L4): absent (0)  Achilles (S1): brisk (3+)    Right   Patellar (L4): normal (2+)  Achilles (S1): normal (2+)    Strength/Myotome Testing     Lumbar     Right   Normal strength    Left Hip   Planes of Motion   Flexion: 4+  Extension: 4+  Abduction: 4+  Adduction: 5  External rotation: 4    Right Hip   Planes of Motion   Flexion: 5  Extension: 5  Abduction: 5  Adduction: 5  External rotation: 5    Left  Knee   Flexion: 4+  Extension: 4+    Right Knee   Flexion: 5  Extension: 5    Left Ankle/Foot   Dorsiflexion: 4+    Right Ankle/Foot   Dorsiflexion: 5    Additional Strength Details  LLE ~25% weaker globally MMT  Pt reports no pain w/ MMT except ER    Tests     Lumbar     Left   Negative passive SLR.     Right   Negative passive SLR.     Additional Tests Details  Lumbar PIVM unremarkable  JAMIE positive on R  HALLIE 18          Assessment & Plan       Assessment  Impairments: abnormal muscle tone, abnormal or restricted ROM, activity intolerance, impaired balance, impaired physical strength, lacks appropriate home exercise program and pain with function   Functional limitations: carrying objects, lifting, walking, uncomfortable because of pain, sitting, standing, stooping and unable to perform repetitive tasks   Assessment details:     Pt presents to PT with symptoms consistent with L sided sciatica and the above diagnoses.  Pt would benefit from skilled PT intervention to address the deficits noted.     Subjective report included the following details which may also be relevant to the pt's status:Last Tuesday severe pain in left posterior hip, self-admitted to ED where she was given injection. L sided hip bursitis approx 10 years ago. Any hip/lumbar motion reported as aggravating. Current walking tolerance reportedly 1.5 miles. 2nd & 3rd  toes felt numb a couple of day prior to incident.      Goals  Plan Goals:   SHORT TERM GOALS: Time for Goal Achievement: 4 weeks   1.  Patient to be compliant and progression of HEP                             2.  Pain level < 5/10 at worst with mentioned activities to improve function  3.  Increased hip, lumbar and SIJ mobility to allow for increased lumbar AROM with less pain.  4.  Increased lumbar AROM to by 25% in all planes to allow for increased ease with sit-stand transfers and functional activities    LONG TERM GOALS: Time for Goal Achievement: 8 weeks  1.  Pt. To improve  score 25 % on Back Index  2.  Pain level < 3/10 with all listed activities to return to normal.  3.  Hip and Lumbar AROM to WFL to allow for return to household & recreational activities w/o increased symptoms  4.  (B) LE and lower abdominal strength to 5/5 to allow for pushing, pulling and activities to occur without pain (driving, sitting, household, job & exercise requirements)      Plan  Therapy options: will be seen for skilled therapy services  Planned modality interventions: cryotherapy, ultrasound, TENS and thermotherapy (hydrocollator packs)  Planned therapy interventions: functional ROM exercises, home exercise program, manual therapy, therapeutic activities, strengthening and neuromuscular re-education  Frequency: 2x week  Duration in visits: 8  Duration in weeks: 4  Treatment plan discussed with: patient        Manual Therapy:    15     mins  06891;  Therapeutic Exercise:    0     mins  28400;     Neuromuscular Shiv:    0    mins  64267;    Therapeutic Activity:     15     mins  23731;     Ultrasound                  __0_  mins  26650  Iontophoresis                 0    mins  62767    Electrical Stimulation     0    mins  54910 ([id number])  Traction                         _0  mins  94436     Evaluation Time:     30  mins  Timed Treatment:   30   mins   Total Treatment:     60   mins    PT: DAVID Persaud License Number:  [id number]  Electronically signed by Duane Bray PT, 08/07/24, 9:43 AM EDT    Certification Period: 8/7/2024 thru 11/4/2024  I certify that the therapy services are furnished while this patient is under my care.  The services outlined above are required by this patient, and will be reviewed every 90 days.         Physician Signature:__________________________________________________    PHYSICIAN: Naye Dunn APRN      DATE:     Please sign and return via fax to [email or web address] . Thank you, [facility].    PT SIGNATURE: DAVID Persaud LICENSE:  [id number]

## 2024-08-09 ENCOUNTER — TREATMENT (OUTPATIENT)
Dept: PHYSICAL THERAPY | Facility: CLINIC | Age: 44
End: 2024-08-09
Payer: COMMERCIAL

## 2024-08-09 DIAGNOSIS — M79.18 GLUTEAL PAIN: ICD-10-CM

## 2024-08-09 DIAGNOSIS — Z74.09 IMPAIRED MOBILITY AND ENDURANCE: ICD-10-CM

## 2024-08-09 DIAGNOSIS — M54.32 SCIATICA OF LEFT SIDE: Primary | ICD-10-CM

## 2024-08-09 NOTE — PROGRESS NOTES
Physical Therapy Daily Treatment Note    Visit Diagnoses:    ICD-10-CM ICD-9-CM   1. Sciatica of left side  M54.32 724.3   2. Gluteal pain  M79.18 729.1   3. Impaired mobility and endurance  Z74.09 V49.89       VISIT#: 2      Cindy Ivan reports: She has not experienced any changes in her back/buttocks area yet. It is worse in the morning and gets better as the day goes on  Current Pain Level:    0/10; Worst:   6-7/10; Best:  0/10  Affected Area: L gluteal, numbness, tingling in toes  Quality of Pain: sharp, cramping, grinding, pressure and squeezing  Relieving factors: medications (steroids, muscle relaxer)  Functional Deficits/Irritating Factors:  carrying objects, lifting, walking, uncomfortable because of pain, sitting, standing, stooping and unable to perform repetitive tasks   Progression: no change yet  Compliance with HEP Reported: Yes    Objective  Presents: Pt 10 min late for her appointment    Added to Program: Pelvic Tilts, HL marching w/TA       No tenderness in her piriformis palpitated today.     See Exercise, Manual, and Modality Logs for complete treatment.     Patient Education: Pt was educated on exercise biomechanical correctness, intensity, and speed. Sent Patient written HEP via [employer] email:  Access Code: [email or web address]  URL: [email or web address]  Date: 08/09/2024  Prepared by: Ana Santos    Exercises  - Supine Piriformis Stretch with Foot on Ground  - 2 x daily - 7 x weekly - 1 sets - 3 reps - 30 sec hold  - Supine Figure 4 Piriformis Stretch  - 2 x daily - 7 x weekly - 1 sets - 3 reps - 30 hold  - Prone Heel Squeeze  - 2 x daily - 7 x weekly - 1 sets - 15 reps - 5 sec hold  - Supine Posterior Pelvic Tilt  - 2 x daily - 7 x weekly - 1 sets - 20 reps - 5 sec hold  - Supine March with Posterior Pelvic Tilt  - 2 x daily - 7 x weekly - 1 sets - 10 reps - 3 s hold    Plan Goals:   SHORT TERM GOALS: Time for Goal Achievement: 4 weeks   1.  Patient to be compliant and progression of HEP                              2.  Pain level < 5/10 at worst with mentioned activities to improve function  3.  Increased hip, lumbar and SIJ mobility to allow for increased lumbar AROM with less pain.  4.  Increased lumbar AROM to by 25% in all planes to allow for increased ease with sit-stand transfers and functional activities     LONG TERM GOALS: Time for Goal Achievement: 8 weeks  1.  Pt. To improve score 25 % on Back Index  2.  Pain level < 3/10 with all listed activities to return to normal.  3.  Hip and Lumbar AROM to WFL to allow for return to household & recreational activities w/o increased symptoms  4.  (B) LE and lower abdominal strength to 5/5 to allow for pushing, pulling and activities to occur without pain (driving, sitting, household, job & exercise requirements)      Assessment:  Cindy demonstrated good technique with her exercises with verbal and tactile directions. Added some core work today. Her engagement of her TA was fair.  Pt will continue to benefit from skilled PT interventions to address current functional deficits and impairments.       Plan: Progress to/Continue with current program.       Timed:  Manual Therapy:            10_    mins  46946;  Ultrasound:                     0      mins  52389;   Therapeutic Exercise:    20     mins  17700;     Neuromuscular Shiv:   0     mins  55581;    Therapeutic Activity:      0     mins  29745;      Iontophoresis              _0__   mins  Dry Needling               _0____   mins         Untimed:  Work Conditioning: __0__ mins 35957  Electrical Stimulation:    0    mins  57019 ( );  Mechanical Traction:       0        mins  40225;   Paraffin                       __0___  mins   Ice/Heat: __0__ mins      Timed Treatment:   30   mins   Total Treatment:     30   mins          KIRSTEN Ross License # [id number]  Physical Therapist Assistant

## 2024-08-15 ENCOUNTER — TREATMENT (OUTPATIENT)
Dept: PHYSICAL THERAPY | Facility: CLINIC | Age: 44
End: 2024-08-15
Payer: COMMERCIAL

## 2024-08-15 DIAGNOSIS — M54.32 SCIATICA OF LEFT SIDE: Primary | ICD-10-CM

## 2024-08-15 DIAGNOSIS — Z74.09 IMPAIRED MOBILITY AND ENDURANCE: ICD-10-CM

## 2024-08-15 DIAGNOSIS — M79.18 GLUTEAL PAIN: ICD-10-CM

## 2024-08-15 NOTE — PROGRESS NOTES
Physical Therapy Daily Treatment Note  [facility]   [address] [place] [address]  P: [phone] F: [phone]    Patient: Cindy Ivan   : 1980  Referring practitioner: DENISHA Patterson*  Date of Initial Visit: Type: THERAPY  Noted: 2024  Today's Date: 8/15/2024  Patient seen for 3 sessions       Visit Diagnoses:    ICD-10-CM ICD-9-CM   1. Sciatica of left side  M54.32 724.3   2. Gluteal pain  M79.18 729.1   3. Impaired mobility and endurance  Z74.09 V49.89         Subjective:  Cindy Ivan reports: Pt states she is doing just ok today and hasn't had much improvement since her previous session. She still has shooting leg pain and tingling in her foot while working out.      Objective   See Exercise, Manual, and Modality Logs for complete treatment.       Assessment:  Pt's symptoms are mod irritable and pt has trouble with moderation of exercise and rest. Pt advised that rest is important. Spent some time on pt education topics, lumbar radiculopathy, prognosis, etc.    Plan:   Continue to monitor and progress.          Timed:         Manual Therapy:    15     mins  11304;     Therapeutic Exercise:    15     mins  76704;     Neuromuscular Shiv:    0    mins  51467;    Therapeutic Activity:     15     mins  16099;     Ultrasound:     0     mins  48580;    Ionto                               0    mins  93338    Un-Timed:  Electrical Stimulation:    15     mins  25278 ( );  Traction     0     mins 13867        Timed Treatment:   45   mins   Total Treatment:     60   mins    Duane Bray PT  [place] License #: [id number]    Physical Therapist

## 2024-08-21 ENCOUNTER — TREATMENT (OUTPATIENT)
Dept: PHYSICAL THERAPY | Facility: CLINIC | Age: 44
End: 2024-08-21
Payer: COMMERCIAL

## 2024-08-21 DIAGNOSIS — Z74.09 IMPAIRED MOBILITY AND ENDURANCE: ICD-10-CM

## 2024-08-21 DIAGNOSIS — M54.32 SCIATICA OF LEFT SIDE: Primary | ICD-10-CM

## 2024-08-21 DIAGNOSIS — M79.18 GLUTEAL PAIN: ICD-10-CM

## 2024-08-21 NOTE — PROGRESS NOTES
Physical Therapy Daily Treatment Note    Visit Diagnoses:    ICD-10-CM ICD-9-CM   1. Sciatica of left side  M54.32 724.3   2. Gluteal pain  M79.18 729.1   3. Impaired mobility and endurance  Z74.09 V49.89       VISIT#: 4      Cindy Ivan reports: Her pain is a lot better. Now she thinks she is having some nerve issue. She is having some tingling, numbness down her LLE. Especially when she stands. She has not done any prolonged walking. The nerve feeling goes away with sitting.   Current Pain Level:    0/10; Worst:   6-7/10; Best:  0/10  Affected Area: L gluteal, numbness, tingling in toes  Quality of Pain: sharp, cramping, grinding, pressure and squeezing  Relieving factors: medications (steroids, muscle relaxer)  Functional Deficits/Irritating Factors:  carrying objects, lifting, walking, uncomfortable because of pain, sitting, standing, stooping and unable to perform repetitive tasks   Progression: improving overall  Compliance with HEP Reported: exercise ball stretch    Objective      Added to Program: Ball roll outs, Balance Board, Reisited Walking Forward., IFC      See Exercise, Manual, and Modality Logs for complete treatment.     Patient Education: Pt was educated on exercise biomechanical correctness, intensity, and speed.     Assessment:  D/C'd Prone heel squeezes from HEP as they are an extension exercise and Cindy did report they bothered her.  Did encourage doing other exercises besides ball roll out. Her L hip is tighter than her R. Pt will continue to benefit from skilled PT interventions to address current functional deficits and impairments.     Plan Goals:   SHORT TERM GOALS: Time for Goal Achievement: 4 weeks   1.  Patient to be compliant and progression of HEP                             2.  Pain level < 5/10 at worst with mentioned activities to improve function  3.  Increased hip, lumbar and SIJ mobility to allow for increased lumbar AROM with less pain.  4.  Increased lumbar AROM to by 25%  in all planes to allow for increased ease with sit-stand transfers and functional activities     LONG TERM GOALS: Time for Goal Achievement: 8 weeks  1.  Pt. To improve score 25 % on Back Index  2.  Pain level < 3/10 with all listed activities to return to normal.  3.  Hip and Lumbar AROM to WFL to allow for return to household & recreational activities w/o increased symptoms  4.  (B) LE and lower abdominal strength to 5/5 to allow for pushing, pulling and activities to occur without pain (driving, sitting, household, job & exercise requirements)      Plan: Progress to/Continue with current program.       Timed:  Manual Therapy:            10_    mins  28146;  Ultrasound:                     0      mins  69128;   Therapeutic Exercise:    30     mins  59072;     Neuromuscular Shiv:   0     mins  05132;    Therapeutic Activity:      0     mins  14551;      Iontophoresis              _0__   mins  Dry Needling               _0____   mins         Untimed:  Work Conditioning: __0__ mins 05748  Electrical Stimulation:    15    mins  72517 ( );  Mechanical Traction:       0        mins  46292;   Paraffin                       __0___  mins   Ice/Heat: __15 with stim__ mins      Timed Treatment:   40   mins   Total Treatment:     55   mins          Ana Santos PTA  [place] License # [id number]  Physical Therapist Assistant

## 2024-08-23 ENCOUNTER — TREATMENT (OUTPATIENT)
Dept: PHYSICAL THERAPY | Facility: CLINIC | Age: 44
End: 2024-08-23
Payer: COMMERCIAL

## 2024-08-23 DIAGNOSIS — M79.18 GLUTEAL PAIN: ICD-10-CM

## 2024-08-23 DIAGNOSIS — M54.32 SCIATICA OF LEFT SIDE: Primary | ICD-10-CM

## 2024-08-23 DIAGNOSIS — Z74.09 IMPAIRED MOBILITY AND ENDURANCE: ICD-10-CM

## 2024-08-23 NOTE — PROGRESS NOTES
Physical Therapy Daily Treatment Note    Visit Diagnoses:    ICD-10-CM ICD-9-CM   1. Sciatica of left side  M54.32 724.3   2. Gluteal pain  M79.18 729.1   3. Impaired mobility and endurance  Z74.09 V49.89       VISIT#: 5      Cindy Ivan reports: she has been doing great in her back since last PT session. The only time her back hurts recently is when waking up and it works out as the day progresses. She is not having any tingling down her leg either. It does still bother her some when sleeping and she changes position often - waking her up.   Current Pain Level:    0/10; Worst:   /10; Best:  0/10  Affected Area: L gluteal, numbness, tingling in toes  Quality of Pain: sharp, cramping, grinding, pressure and squeezing  Relieving factors: medications (steroids, muscle relaxer)  Functional Deficits/Irritating Factors:  carrying objects, lifting, walking, uncomfortable because of pain, sitting, standing, stooping and unable to perform repetitive tasks   Progression: improving  Compliance with HEP Reported: yes    Objective  Presents: normal gait  Increased sets/reps of:  none   Increased resistance on:  sciFit, HL marching  Added to Program: quadruped hip extension, AUNDREA, Femoral nerve stretch        See Exercise, Manual, and Modality Logs for complete treatment.     Patient Education: Pt was educated on exercise biomechanical correctness, intensity, and speed.     Assessment:  Cindy has been doing really well since last PT session. Her radiculopathy subsided. However, this morning she got some femoral nerve irritation with some tingling in front of her L thigh after AUNDREA. This is different than the symptoms she was having down the back of her leg. Added a few new exercises with more extension base. Will monitor her response. Pt will continue to benefit from skilled PT interventions to address current functional deficits and impairments.     SHORT TERM GOALS: Time for Goal Achievement: 4 weeks   1.  Patient to be  compliant and progression of HEP                             2.  Pain level < 5/10 at worst with mentioned activities to improve function  3.  Increased hip, lumbar and SIJ mobility to allow for increased lumbar AROM with less pain.  4.  Increased lumbar AROM to by 25% in all planes to allow for increased ease with sit-stand transfers and functional activities     LONG TERM GOALS: Time for Goal Achievement: 8 weeks  1.  Pt. To improve score 25 % on Back Index  2.  Pain level < 3/10 with all listed activities to return to normal.  3.  Hip and Lumbar AROM to WFL to allow for return to household & recreational activities w/o increased symptoms  4.  (B) LE and lower abdominal strength to 5/5 to allow for pushing, pulling and activities to occur without pain (driving, sitting, household, job & exercise requirements)        Plan: Progress to/Continue with current program. Assess response to today's session      Timed:  Manual Therapy:            15_    mins  08154;  Ultrasound:                     0      mins  84012;   Therapeutic Exercise:    40     mins  17536;     Neuromuscular Shiv:   0     mins  13041;    Therapeutic Activity:      0     mins  52168;      Iontophoresis              _0__   mins  Dry Needling               _0____   mins         Untimed:  Work Conditioning: __0__ mins 79182  Electrical Stimulation:    0    mins  81211 ( );  Mechanical Traction:       0        mins  49323;   Paraffin                       __0___  mins   Ice/Heat: __0__ mins      Timed Treatment:   40   mins   Total Treatment:     55   mins          KIRSTEN Ross License # [id number]  Physical Therapist Assistant

## 2024-08-27 ENCOUNTER — TREATMENT (OUTPATIENT)
Dept: PHYSICAL THERAPY | Facility: CLINIC | Age: 44
End: 2024-08-27
Payer: COMMERCIAL

## 2024-08-27 DIAGNOSIS — M54.32 SCIATICA OF LEFT SIDE: Primary | ICD-10-CM

## 2024-08-27 DIAGNOSIS — M79.18 GLUTEAL PAIN: ICD-10-CM

## 2024-08-27 DIAGNOSIS — Z74.09 IMPAIRED MOBILITY AND ENDURANCE: ICD-10-CM

## 2024-08-27 NOTE — PROGRESS NOTES
Physical Therapy Daily Treatment Note  [facility] Physical Therapy [facility]   [address] [place] [address]  P: [phone] F: [phone]    Patient: Cindy Ivan   : 1980  Referring practitioner: DAJUAN Patterson  Date of Initial Visit: Type: THERAPY  Noted: 2024  Today's Date: 2024  Patient seen for 6 sessions       Visit Diagnoses:    ICD-10-CM ICD-9-CM   1. Sciatica of left side  M54.32 724.3   2. Gluteal pain  M79.18 729.1   3. Impaired mobility and endurance  Z74.09 V49.89         Subjective:  Cindy Ivan reports: Some pain in morning. No tingling or peripheral symptoms for the past week and hasn't been going to gym for the same time.    Objective   Pros: Flossing, improvement with peripheral neuro s/s    See Exercise, Manual, and Modality Logs for complete treatment.       Assessment:  Pt demonstrates improvement with core and lumbar strength, endurance and activity tolerance based on subjective report and observation during exercise this date.        Plan:   Continue to monitor and progress.          Timed:         Manual Therapy:    0     mins  63696;     Therapeutic Exercise:    15     mins  41960;     Neuromuscular Shiv:    15    mins  19302;    Therapeutic Activity:     15     mins  47050;     Ultrasound:     0     mins  38444;    Ionto                               0    mins  01550    Un-Timed:  Electrical Stimulation:    15     mins  69230 ( );  Traction     0     mins 57356        Timed Treatment:   45   mins   Total Treatment:     60   mins    Duane Bray, PT  [place] License #: [id number]    Physical Therapist

## 2024-08-29 ENCOUNTER — TREATMENT (OUTPATIENT)
Dept: PHYSICAL THERAPY | Facility: CLINIC | Age: 44
End: 2024-08-29
Payer: COMMERCIAL

## 2024-08-29 DIAGNOSIS — M54.32 SCIATICA OF LEFT SIDE: Primary | ICD-10-CM

## 2024-08-29 DIAGNOSIS — M79.18 GLUTEAL PAIN: ICD-10-CM

## 2024-08-29 DIAGNOSIS — Z74.09 IMPAIRED MOBILITY AND ENDURANCE: ICD-10-CM

## 2024-08-29 NOTE — PROGRESS NOTES
Physical Therapy Daily Treatment Note  [facility] [place] [facility]   [address] [place] [address]  P: [phone] F: [phone]    Patient: Cindy Ivan   : 1980  Referring practitioner: DENISHA Patterson*  Date of Initial Visit: Type: THERAPY  Noted: 2024  Today's Date: 2024  Patient seen for 7 sessions       Visit Diagnoses:    ICD-10-CM ICD-9-CM   1. Sciatica of left side  M54.32 724.3   2. Gluteal pain  M79.18 729.1   3. Impaired mobility and endurance  Z74.09 V49.89         Subjective:  Cindy Ivan reports: Pt states she is doing well today and had no pain or undue soreness since previous session. Symptoms seem to be improving with ExRx and pt is pleased with physical therapy progress so far.        Objective   See Exercise, Manual, and Modality Logs for complete treatment.       Assessment:  Pt demonstrates improvement with core and lumbar strength, endurance and activity tolerance based on subjective report and observation during exercise this date.        Plan:   Continue to monitor and progress.          Timed:         Manual Therapy:    0     mins  31239;     Therapeutic Exercise:    15     mins  61869;     Neuromuscular Shiv:    15    mins  90948;    Therapeutic Activity:     15     mins  74308;     Ultrasound:     0     mins  85501;    Ionto                               0    mins  28136    Un-Timed:  Electrical Stimulation:    15     mins  79393 ( );  Traction     0     mins 94098        Timed Treatment:   45   mins   Total Treatment:     60   mins    Duane Bray PT  [place] License #: [id number]    Physical Therapist

## 2024-09-04 ENCOUNTER — TREATMENT (OUTPATIENT)
Dept: PHYSICAL THERAPY | Facility: CLINIC | Age: 44
End: 2024-09-04
Payer: COMMERCIAL

## 2024-09-04 DIAGNOSIS — Z74.09 IMPAIRED MOBILITY AND ENDURANCE: ICD-10-CM

## 2024-09-04 DIAGNOSIS — M54.32 SCIATICA OF LEFT SIDE: Primary | ICD-10-CM

## 2024-09-04 DIAGNOSIS — M79.18 GLUTEAL PAIN: ICD-10-CM

## 2024-09-04 NOTE — PROGRESS NOTES
Physical Therapy Daily Treatment Note    Visit Diagnoses:    ICD-10-CM ICD-9-CM   1. Sciatica of left side  M54.32 724.3   2. Gluteal pain  M79.18 729.1   3. Impaired mobility and endurance  Z74.09 V49.89       VISIT#: 8      Cindy Ivan reports: She is doing pretty good. Her back has been doing well but she still takes some OTC medicine in the morning for soreness.  Current Pain Level:    0/10; Worst:   /10; Best:  0/10  Affected Area: L gluteal, numbness, tingling in toes  Quality of Pain: sharp, cramping, grinding, pressure and squeezing  Relieving factors: medications (steroids, muscle relaxer)  Functional Deficits/Irritating Factors:  carrying objects, lifting, walking, uncomfortable because of pain, sitting, standing, stooping and unable to perform repetitive tasks   Progression: improving  Compliance with HEP Reported: partial    Objective    Increased sets/reps of:  bird dog, dead bugs   Increased resistance on:  none  Added to Program: Modified Plank, Modified Side Plank, resisted sidestepping        See Exercise, Manual, and Modality Logs for complete treatment.     Patient Education: Pt was educated on exercise biomechanical correctness, intensity, and speed.     Assessment:  Cindy has made a lot of progress. She has met all of her STGs and several of her LTGs. She is still experiencing some soreness in the early morning that she takes OTC medicine for. Her total exercise volume was increased today with more demand on her core and hips. She reported mild soreness post session.  Pt will continue to benefit from skilled PT interventions to address current functional deficits and impairments.     SHORT TERM GOALS: Time for Goal Achievement: 4 weeks   1.  Patient to be compliant and progression of HEP     - MET                         2.  Pain level < 5/10 at worst with mentioned activities to improve function - MET  3.  Increased hip, lumbar and SIJ mobility to allow for increased lumbar AROM with less  pain.- MET  4.  Increased lumbar AROM to by 25% in all planes to allow for increased ease with sit-stand transfers and functional activities - MET     LONG TERM GOALS: Time for Goal Achievement: 8 weeks  1.  Pt. To improve score 25 % on Back Index  2.  Pain level < 3/10 with all listed activities to return to normal. - MET  3.  Hip and Lumbar AROM to WFL to allow for return to household & recreational activities w/o increased symptoms  4.  (B) LE and lower abdominal strength to 5/5 to allow for pushing, pulling and activities to occur without pain (driving, sitting, household, job & exercise requirements)         Plan: Progress to/Continue with current program. Access long term response to exercises from today.       Timed:  Manual Therapy:            0_    mins  34769;  Ultrasound:                     0      mins  98309;   Therapeutic Exercise:    38     mins  76535;     Neuromuscular Shiv:   00     mins  13030;    Therapeutic Activity:      15     mins  78820;      Iontophoresis              _0__   mins  Dry Needling               _0____   mins         Untimed:  Work Conditioning: __0__ mins 65766  Electrical Stimulation:    0    mins  26528 ( );  Mechanical Traction:       0        mins  24237;   Paraffin                       __0___  mins   Ice/Heat: __0__ mins      Timed Treatment:   53   mins   Total Treatment:     53   mins          KIRSTEN Ross License # [id number]  Physical Therapist Assistant

## 2025-03-20 ENCOUNTER — TELEPHONE (OUTPATIENT)
Dept: SURGERY | Facility: CLINIC | Age: 45
End: 2025-03-20
Payer: COMMERCIAL

## 2025-03-20 NOTE — TELEPHONE ENCOUNTER
Spoke to pt and got her jorge for a new pt appt with yumiko rodriguez  Pt stated understanding  Mailed new pt packet